# Patient Record
Sex: FEMALE | URBAN - METROPOLITAN AREA
[De-identification: names, ages, dates, MRNs, and addresses within clinical notes are randomized per-mention and may not be internally consistent; named-entity substitution may affect disease eponyms.]

---

## 2024-08-14 ENCOUNTER — APPOINTMENT (RX ONLY)
Dept: URBAN - METROPOLITAN AREA CLINIC 17 | Facility: CLINIC | Age: 74
Setting detail: DERMATOLOGY
End: 2024-08-14

## 2024-08-14 DIAGNOSIS — L70.8 OTHER ACNE: ICD-10-CM

## 2024-08-14 DIAGNOSIS — Z85.828 PERSONAL HISTORY OF OTHER MALIGNANT NEOPLASM OF SKIN: ICD-10-CM

## 2024-08-14 DIAGNOSIS — L82.0 INFLAMED SEBORRHEIC KERATOSIS: ICD-10-CM | Status: INADEQUATELY CONTROLLED

## 2024-08-14 DIAGNOSIS — Z71.89 OTHER SPECIFIED COUNSELING: ICD-10-CM

## 2024-08-14 DIAGNOSIS — L82.1 OTHER SEBORRHEIC KERATOSIS: ICD-10-CM

## 2024-08-14 DIAGNOSIS — L81.4 OTHER MELANIN HYPERPIGMENTATION: ICD-10-CM

## 2024-08-14 DIAGNOSIS — B07.8 OTHER VIRAL WARTS: ICD-10-CM | Status: INADEQUATELY CONTROLLED

## 2024-08-14 DIAGNOSIS — L90.8 OTHER ATROPHIC DISORDERS OF SKIN: ICD-10-CM

## 2024-08-14 PROCEDURE — ? RECOMMENDATIONS

## 2024-08-14 PROCEDURE — 17110 DESTRUCTION B9 LES UP TO 14: CPT

## 2024-08-14 PROCEDURE — ? COUNSELING

## 2024-08-14 PROCEDURE — 99213 OFFICE O/P EST LOW 20 MIN: CPT | Mod: 25

## 2024-08-14 PROCEDURE — ? LIQUID NITROGEN

## 2024-08-14 ASSESSMENT — LOCATION SIMPLE DESCRIPTION DERM
LOCATION SIMPLE: LEFT POPLITEAL SKIN
LOCATION SIMPLE: RIGHT POPLITEAL SKIN
LOCATION SIMPLE: LEFT SHOULDER
LOCATION SIMPLE: RIGHT CALF
LOCATION SIMPLE: RIGHT UPPER BACK
LOCATION SIMPLE: LEFT CHEEK
LOCATION SIMPLE: LEFT UPPER BACK
LOCATION SIMPLE: RIGHT CHEEK
LOCATION SIMPLE: NOSE
LOCATION SIMPLE: LEFT KNEE

## 2024-08-14 ASSESSMENT — LOCATION DETAILED DESCRIPTION DERM
LOCATION DETAILED: RIGHT POPLITEAL SKIN
LOCATION DETAILED: LEFT POPLITEAL SKIN
LOCATION DETAILED: RIGHT LATERAL UPPER BACK
LOCATION DETAILED: LEFT ANTERIOR SHOULDER
LOCATION DETAILED: LEFT SUPERIOR UPPER BACK
LOCATION DETAILED: RIGHT PROXIMAL CALF
LOCATION DETAILED: LEFT KNEE
LOCATION DETAILED: LEFT SUPERIOR CENTRAL BUCCAL CHEEK
LOCATION DETAILED: RIGHT SUPERIOR MEDIAL BUCCAL CHEEK
LOCATION DETAILED: RIGHT NASAL DORSUM
LOCATION DETAILED: LEFT MEDIAL POPLITEAL SKIN

## 2024-08-14 ASSESSMENT — LOCATION ZONE DERM
LOCATION ZONE: NOSE
LOCATION ZONE: ARM
LOCATION ZONE: LEG
LOCATION ZONE: TRUNK
LOCATION ZONE: FACE

## 2024-08-14 NOTE — PROCEDURE: RECOMMENDATIONS
Recommendation Override: CO2 laser, consult Dr. Cadet for CO2 laser possible neck/face lift
Detail Level: Zone
Render Risk Assessment In Note?: no
Recommendation Preamble: The following recommendations were made during the visit:

## 2024-08-14 NOTE — PROCEDURE: LIQUID NITROGEN
Post-Care Instructions: I reviewed with the patient in detail post-care instructions. Patient is to wear sunprotection, and avoid picking at any of the treated lesions. Pt may apply Vaseline to crusted or scabbing areas.
Render Post-Care Instructions In Note?: no
Show Aperture Variable?: Yes
Consent: The patient's consent was obtained including but not limited to risks of crusting, scabbing, blistering, scarring, darker or lighter pigmentary change, recurrence, incomplete removal and infection.
Medical Necessity Clause: This procedure was medically necessary because the lesions that were treated were:
Spray Paint Text: The liquid nitrogen was applied to the skin utilizing a spray paint frosting technique.
Detail Level: Detailed
Medical Necessity Information: It is in your best interest to select a reason for this procedure from the list below. All of these items fulfill various CMS LCD requirements except the new and changing color options.

## 2025-01-28 ENCOUNTER — APPOINTMENT (OUTPATIENT)
Dept: URBAN - METROPOLITAN AREA CLINIC 17 | Facility: CLINIC | Age: 75
Setting detail: DERMATOLOGY
End: 2025-01-28

## 2025-01-28 DIAGNOSIS — Z41.9 ENCOUNTER FOR PROCEDURE FOR PURPOSES OTHER THAN REMEDYING HEALTH STATE, UNSPECIFIED: ICD-10-CM

## 2025-01-28 PROCEDURE — ? PATIENT SPECIFIC COUNSELING

## 2025-01-28 NOTE — PROCEDURE: PATIENT SPECIFIC COUNSELING
Patient presents today for a cosmetic consultation. She is interested in getting ECO2 and possibly a facelift. She wants to treat her wrinkles and excess laxity in her neck. I showed the patient what our ECO2 laser looks like and the room in which she would get treated. I as well explained the science behind the ECO2 laser to the patient. I also recommended filler for her marionette lines and botox for the lines on her forehead. Highly recommending the ECO2 for her desired result. We discussed the recovery process for ECO2 and methods for covering up the discoloration. I also mentioned using Kuvarya for her skin texture and pigmentation.
Detail Level: Simple

## 2025-03-04 ENCOUNTER — APPOINTMENT (OUTPATIENT)
Dept: URBAN - METROPOLITAN AREA CLINIC 17 | Facility: CLINIC | Age: 75
Setting detail: DERMATOLOGY
End: 2025-03-04

## 2025-03-04 VITALS — WEIGHT: 162 LBS | HEIGHT: 64 IN

## 2025-03-04 VITALS
DIASTOLIC BLOOD PRESSURE: 88 MMHG | HEIGHT: 64 IN | SYSTOLIC BLOOD PRESSURE: 162 MMHG | WEIGHT: 162 LBS | HEART RATE: 73 BPM

## 2025-03-04 DIAGNOSIS — Z41.9 ENCOUNTER FOR PROCEDURE FOR PURPOSES OTHER THAN REMEDYING HEALTH STATE, UNSPECIFIED: ICD-10-CM

## 2025-03-04 PROCEDURE — ? PATIENT SPECIFIC COUNSELING

## 2025-03-04 PROCEDURE — ? PRESCRIPTION

## 2025-03-04 NOTE — HPI: OTHER
Condition:: cosmetic procedure
Please Describe Your Condition:: is an established patient who is being seen for a cosmetic procedure: full face CO2 laser resurfacing with sedation.

## 2025-03-04 NOTE — PROCEDURE: PATIENT SPECIFIC COUNSELING
Detail Level: Simple
Patient presents for a pre-operative evaluation for a full face CO2 laser resurfacing with sedation. \\n\\nCarl () is patient’s point of contact. His phone number is [phone].\\n\\nPatient does not endorse hx of bleeding, diabetes, hepatitis, HIV, defibrillator or pacemaker, myocardial infarction, asthma, dry eyes or tobacco use. SNAP test to be completed day of. She does take a baby Aspirin daily, but not for any cardiac history that she is aware of. Will request patient to hold medication 1 week prior. Patient does endorse a history of hypertension, and on Losartan. Patient instructed to hold Losartan the morning of the procedure, she states she is not consistent with takin the medication at home. Blood pressure obtained this visit (see vitals). \\n\\nPast medical history, surgical history, allergies (NKDA), and medications were reviewed today and will be updated in [facility] once H&P is received. Patient medications will be updated and reviewed as well. In the past, patient states she did not experience nausea, vomiting or combativeness post anesthesia. This will be discussed with provider and CRNA. \\n\\nThe patient states she drinks about 2x/week. Discussed alcohol can increase bleeding risk. Requested patient stop drinking for one week prior and one week post procedure. Patient verbalized understanding. \\n\\nPatient with questions regarding procedure and post care and healing process. All questions answered today. \\n\\nMedical records release form was filled out, will be requesting this week.\\n\\nEvaluation will be discussed with MD. Medications will be sent after discussion.

## 2025-03-05 RX ORDER — VALACYCLOVIR 500 MG/1
TABLET, FILM COATED ORAL BID
Qty: 14 | Refills: 0 | Status: ERX | COMMUNITY
Start: 2025-03-05

## 2025-03-05 RX ORDER — CEPHALEXIN 500 MG/1
TABLET ORAL BID
Qty: 14 | Refills: 0 | Status: ERX | COMMUNITY
Start: 2025-03-05

## 2025-03-05 RX ORDER — TRETINOIN/HYALURONATE/NIACIN 0.025-0.5%
CREAM (GRAM) TOPICAL
Qty: 30 | Refills: 11 | Status: ERX | COMMUNITY
Start: 2025-03-05

## 2025-03-05 RX ORDER — TIMOLOL MALEATE 5 MG/ML
SOLUTION/ DROPS OPHTHALMIC
Qty: 15 | Refills: 0 | Status: ERX | COMMUNITY
Start: 2025-03-05

## 2025-03-05 RX ORDER — TRIAMCINOLONE ACETONIDE 1 MG/G
OINTMENT TOPICAL BID
Qty: 30 | Refills: 2 | Status: ERX | COMMUNITY
Start: 2025-03-05

## 2025-03-05 RX ADMIN — TRIAMCINOLONE ACETONIDE 1: 1 OINTMENT TOPICAL at 00:00

## 2025-03-05 RX ADMIN — TIMOLOL MALEATE 1: 5 SOLUTION/ DROPS OPHTHALMIC at 00:00

## 2025-03-05 RX ADMIN — VALACYCLOVIR 1: 500 TABLET, FILM COATED ORAL at 00:00

## 2025-03-05 RX ADMIN — CEPHALEXIN 1: 500 TABLET ORAL at 00:00

## 2025-03-05 RX ADMIN — Medication 1: at 00:00

## 2025-03-11 ENCOUNTER — APPOINTMENT (OUTPATIENT)
Dept: URBAN - METROPOLITAN AREA CLINIC 17 | Facility: CLINIC | Age: 75
Setting detail: DERMATOLOGY
End: 2025-03-11

## 2025-03-11 VITALS — DIASTOLIC BLOOD PRESSURE: 106 MMHG | SYSTOLIC BLOOD PRESSURE: 191 MMHG | HEART RATE: 62 BPM

## 2025-03-11 DIAGNOSIS — Z41.9 ENCOUNTER FOR PROCEDURE FOR PURPOSES OTHER THAN REMEDYING HEALTH STATE, UNSPECIFIED: ICD-10-CM

## 2025-03-11 PROCEDURE — ? LASER RESURFACING

## 2025-03-11 ASSESSMENT — LOCATION SIMPLE DESCRIPTION DERM
LOCATION SIMPLE: RIGHT CHEEK
LOCATION SIMPLE: LEFT CHEEK
LOCATION SIMPLE: RIGHT FOREHEAD
LOCATION SIMPLE: LEFT FOREHEAD
LOCATION SIMPLE: RIGHT LIP
LOCATION SIMPLE: LEFT LIP

## 2025-03-11 ASSESSMENT — LOCATION DETAILED DESCRIPTION DERM
LOCATION DETAILED: LEFT LOWER CUTANEOUS LIP
LOCATION DETAILED: LEFT FOREHEAD
LOCATION DETAILED: LEFT UPPER CUTANEOUS LIP
LOCATION DETAILED: RIGHT CENTRAL MALAR CHEEK
LOCATION DETAILED: LEFT SUPERIOR CENTRAL BUCCAL CHEEK
LOCATION DETAILED: RIGHT FOREHEAD
LOCATION DETAILED: RIGHT UPPER CUTANEOUS LIP
LOCATION DETAILED: RIGHT LOWER CUTANEOUS LIP
LOCATION DETAILED: RIGHT SUPERIOR MEDIAL BUCCAL CHEEK
LOCATION DETAILED: LEFT CENTRAL MALAR CHEEK

## 2025-03-11 ASSESSMENT — LOCATION ZONE DERM
LOCATION ZONE: LIP
LOCATION ZONE: FACE

## 2025-03-11 NOTE — HPI: OTHER
Condition:: cosmetic procedure
Please Describe Your Condition:: is an established patient who is being seen for a cosmetic procedure: full face CO2 laser resurfacing.

## 2025-03-11 NOTE — PROCEDURE: LASER RESURFACING
Consent: Written consent obtained, risks reviewed including but not limited to crusting, scabbing, blistering, scarring, darker or lighter pigmentary change, incomplete improvement of dyschromia, wrinkles, prolonged erythema and facial swelling, infection and bleeding.
Detail Level: Detailed
Laser Type: Lumenis UltraPulse CO2 laser
Topical Anesthesia?: 23% lidocaine, 7% tetracaine
Number Of Passes: 1
Post-Care Instructions: I reviewed with the patient in detail post-care instructions. Patient should avoid sun until area fully healed. Pt should apply vaseline to treated areas, and remove crusts gently with water-vinegar soaks.
Anesthesia Volume In Cc: 9
Anesthesia Type: 1% lidocaine with epinephrine
Corneal Shield Text: A corneal shield was inserted after appropriate application of topical anesthesia.
Energy(Mj): 125
Was A Corneal Shield Used?: Yes
Wavelength: 10,600nm
External Cooling Fan Speed: 5
Length Of Topical Anesthesia Application (Optional): 60 minutes

## 2025-03-12 ENCOUNTER — APPOINTMENT (OUTPATIENT)
Dept: URBAN - METROPOLITAN AREA CLINIC 17 | Facility: CLINIC | Age: 75
Setting detail: DERMATOLOGY
End: 2025-03-12

## 2025-03-12 DIAGNOSIS — Z41.9 ENCOUNTER FOR PROCEDURE FOR PURPOSES OTHER THAN REMEDYING HEALTH STATE, UNSPECIFIED: ICD-10-CM

## 2025-03-12 PROCEDURE — ? COSMETIC FOLLOW-UP

## 2025-03-12 ASSESSMENT — LOCATION DETAILED DESCRIPTION DERM: LOCATION DETAILED: PHILTRUM

## 2025-03-12 ASSESSMENT — LOCATION ZONE DERM: LOCATION ZONE: LIP

## 2025-03-12 ASSESSMENT — LOCATION SIMPLE DESCRIPTION DERM: LOCATION SIMPLE: UPPER LIP

## 2025-03-12 NOTE — PROCEDURE: COSMETIC FOLLOW-UP
Comments (Free Text): Patient presents for one day post op full face CO2 laser resurfacing.\\n\\nPatient had soak in office. Patient tolerated procedure well with no issues. Patient is in minimal pain. \\n\\nPatient continues to do the distilled water and vinegar soaks and is applying Vaseline QID. No evidence of infection today. 
Detail Level: Zone

## 2025-03-17 ENCOUNTER — APPOINTMENT (OUTPATIENT)
Dept: URBAN - METROPOLITAN AREA CLINIC 41 | Facility: CLINIC | Age: 75
Setting detail: DERMATOLOGY
End: 2025-03-17

## 2025-03-17 DIAGNOSIS — Z41.9 ENCOUNTER FOR PROCEDURE FOR PURPOSES OTHER THAN REMEDYING HEALTH STATE, UNSPECIFIED: ICD-10-CM

## 2025-03-17 PROCEDURE — ? COSMETIC FOLLOW-UP

## 2025-03-17 PROCEDURE — ? PRESCRIPTION

## 2025-03-17 RX ORDER — CLOBETASOL PROPIONATE 0.5 MG/G
OINTMENT TOPICAL
Qty: 30 | Refills: 0 | Status: ERX | COMMUNITY
Start: 2025-03-17

## 2025-03-17 RX ADMIN — CLOBETASOL PROPIONATE: 0.5 OINTMENT TOPICAL at 00:00

## 2025-03-17 NOTE — PROCEDURE: COSMETIC FOLLOW-UP
Comments (Free Text): Patient presents prior to one week appointment post full face CO2 laser resurfacing. \\n\\nPatient states around her eyes and mouth have burned significantly for the past 3 days. Continues to do Vaseline mixture and plain Vaseline throughout the day. Patient states after vinegar soaks, it became worse. She appears red, but healing within normal limits. She is taking her antibiotic and Valtrex.\\n\\nUpon assessment, periocular and perioral area burn greater than submental chin. Discussed using Triamcinolone up to 4 times per day. No signs of scarring at this point. However, would like the patient to follow-up frequently to monitor for signs of symptoms are scarring. Discussed stopping the timolol drops. Would like to prescribe Clobestasol 0.05% twice daily around the mouth for one week. Patient stated when she does not have Vaseline on, her face is very dry and tight. This is normal. Recommended to apply as much as needed throughout the day. \\n\\nPatient will be healed mostly within the next 2 weeks. She can expect redness and dryness for the next month. Will have patient follow up in one week.
Detail Level: Zone

## 2025-03-18 RX ORDER — TRIAMCINOLONE ACETONIDE 1 MG/G
OINTMENT TOPICAL BID
Qty: 30 | Refills: 2 | Status: ERX

## 2025-03-25 ENCOUNTER — APPOINTMENT (OUTPATIENT)
Dept: URBAN - METROPOLITAN AREA CLINIC 17 | Facility: CLINIC | Age: 75
Setting detail: DERMATOLOGY
End: 2025-03-25

## 2025-03-25 DIAGNOSIS — Z41.9 ENCOUNTER FOR PROCEDURE FOR PURPOSES OTHER THAN REMEDYING HEALTH STATE, UNSPECIFIED: ICD-10-CM

## 2025-03-25 PROCEDURE — ? COSMETIC FOLLOW-UP

## 2025-03-25 NOTE — PROCEDURE: COSMETIC FOLLOW-UP
Comments (Free Text): Patient presents for a 2 weeks follow up from full face CO2 laser resurfacing.\\n\\nPatient has concerns regarding dryness of chin and mouth. The healing process was explained more in detail to patient. New epidermis is coming in, and old skin will fall off. Patient was discouraged from scrubbing skin, but was encouraged to use microfiber towel to remove loose skin if needed.\\n\\nPatient asked if she can begin retinol, and was told to wait two more weeks until a full month has passed after procedure. Patient was encouraged to use skin nectar, and ultra moisturizer for now which were given to her post laser treatment. Patient was also advised to wait two more days before using SPF to allow time for more healing. \\n\\nPatient asked if spray tanning is safe. There are no issues with this, but it is recommended to wait till 6 weeks.\\n\\nOverall, patients skin looks good, and is healing as expected.
Detail Level: Zone

## 2025-04-10 ENCOUNTER — APPOINTMENT (OUTPATIENT)
Dept: URBAN - METROPOLITAN AREA CLINIC 17 | Facility: CLINIC | Age: 75
Setting detail: DERMATOLOGY
End: 2025-04-10

## 2025-04-10 DIAGNOSIS — Z41.9 ENCOUNTER FOR PROCEDURE FOR PURPOSES OTHER THAN REMEDYING HEALTH STATE, UNSPECIFIED: ICD-10-CM

## 2025-04-10 PROCEDURE — ? COSMETIC FOLLOW-UP

## 2025-04-25 ENCOUNTER — RX ONLY (RX ONLY)
Age: 75
End: 2025-04-25

## 2025-04-25 RX ORDER — TRETINOIN/HYALURONATE/NIACIN 0.025-0.5%
CREAM (GRAM) TOPICAL
Qty: 30 | Refills: 11 | Status: ERX

## 2025-06-26 ENCOUNTER — APPOINTMENT (OUTPATIENT)
Dept: URBAN - METROPOLITAN AREA CLINIC 17 | Facility: CLINIC | Age: 75
Setting detail: DERMATOLOGY
End: 2025-06-26

## 2025-06-26 DIAGNOSIS — Z41.9 ENCOUNTER FOR PROCEDURE FOR PURPOSES OTHER THAN REMEDYING HEALTH STATE, UNSPECIFIED: ICD-10-CM

## 2025-06-26 PROCEDURE — ? PATIENT SPECIFIC COUNSELING

## 2025-06-26 NOTE — PROCEDURE: PATIENT SPECIFIC COUNSELING
Detail Level: Simple
Patient presents today for a 3 month follow up post CO2 laser resurfacing.\\n\\nPatient states she is happy and feeling good about her results. Patient with questions regarding periocular redness and upper eyelid redness that seems persistent. Further, patient with questions regarding irregular nose bleeds that seem to correlate with the procedure date.\\n\\nPatient has seen PCP who prescribed Mupirocin and patient has been using it for the past two days. Nose bleeds do not seem to be related to CO2 laser resurfacing. Discussed persistent pinkness. Patient denies burning. It may take an additional few months, but it will heal and fade. Explained this is new collagen building. \\n\\nDiscussed lower blepharoplasty and healing. \\n\\nPatient will reach out to office with questions or concerns.